# Patient Record
Sex: MALE | Race: WHITE | NOT HISPANIC OR LATINO | Employment: OTHER | URBAN - METROPOLITAN AREA
[De-identification: names, ages, dates, MRNs, and addresses within clinical notes are randomized per-mention and may not be internally consistent; named-entity substitution may affect disease eponyms.]

---

## 2018-01-25 ENCOUNTER — OFFICE VISIT (OUTPATIENT)
Dept: CARDIOLOGY | Facility: CLINIC | Age: 78
End: 2018-01-25
Attending: INTERNAL MEDICINE
Payer: MEDICARE

## 2018-01-25 VITALS
HEIGHT: 71 IN | WEIGHT: 248 LBS | BODY MASS INDEX: 34.72 KG/M2 | DIASTOLIC BLOOD PRESSURE: 89 MMHG | HEART RATE: 81 BPM | SYSTOLIC BLOOD PRESSURE: 153 MMHG

## 2018-01-25 DIAGNOSIS — J44.9 CHRONIC OBSTRUCTIVE PULMONARY DISEASE, UNSPECIFIED COPD TYPE: ICD-10-CM

## 2018-01-25 DIAGNOSIS — I10 ESSENTIAL HYPERTENSION: Primary | ICD-10-CM

## 2018-01-25 DIAGNOSIS — I48.20 CHRONIC ATRIAL FIBRILLATION: ICD-10-CM

## 2018-01-25 DIAGNOSIS — I63.9 CEREBROVASCULAR ACCIDENT (CVA), UNSPECIFIED MECHANISM: ICD-10-CM

## 2018-01-25 DIAGNOSIS — E11.9 TYPE 2 DIABETES MELLITUS WITHOUT COMPLICATION, WITHOUT LONG-TERM CURRENT USE OF INSULIN: ICD-10-CM

## 2018-01-25 DIAGNOSIS — G47.33 OSA (OBSTRUCTIVE SLEEP APNEA): ICD-10-CM

## 2018-01-25 DIAGNOSIS — E66.01 MORBID OBESITY: ICD-10-CM

## 2018-01-25 PROCEDURE — 93000 ELECTROCARDIOGRAM COMPLETE: CPT | Mod: S$GLB,,, | Performed by: INTERNAL MEDICINE

## 2018-01-25 PROCEDURE — 99213 OFFICE O/P EST LOW 20 MIN: CPT | Mod: S$GLB,,, | Performed by: INTERNAL MEDICINE

## 2018-01-26 NOTE — PROGRESS NOTES
Subjective:    Patient ID:  Riley Alas is a 77 y.o. male     HPI  Here for F/U of HBP, AF, COPD, MONO, DM    I feel well. I am not using my CPAP, and am due to have another sleep study.    Current Outpatient Prescriptions   Medication Sig    ADVAIR DISKUS 250-50 mcg/dose diskus inhaler     allopurinol (ZYLOPRIM) 300 MG tablet Take 300 mg by mouth once daily.      ASMANEX TWISTHALER 220 mcg (60 doses) AePB     atorvastatin (LIPITOR) 20 MG tablet     azithromycin (Z-NANETTE) 250 MG tablet     calcitRIOL (ROCALTROL) 0.25 MCG Cap     cefdinir (OMNICEF) 300 MG capsule     cephALEXin (KEFLEX) 500 MG capsule     CHERATUSSIN AC  mg/5 mL syrup     dabigatran etexilate (PRADAXA) 150 mg Cap Take 1 capsule (150 mg total) by mouth 2 (two) times daily.    digoxin (LANOXIN) 250 mcg tablet Take 250 mcg by mouth once daily.    diltiazem (CARDIZEM CD) 180 MG 24 hr capsule     diltiazem (CARDIZEM) 120 MG tablet     finasteride (PROSCAR) 5 mg tablet     fluoxetine (PROZAC) 20 MG tablet Take 20 mg by mouth once daily.      fluoxetine (PROZAC) 40 MG capsule     furosemide (LASIX) 10 mg/mL solution Take by mouth once daily.      JANUVIA 50 mg Tab     levetiracetam (KEPPRA) 500 MG Tab     levothyroxine (SYNTHROID) 25 MCG tablet     losartan (COZAAR) 100 MG tablet Take 100 mg by mouth once daily.    losartan (COZAAR) 25 MG tablet     metoprolol succinate (TOPROL-XL) 100 MG 24 hr tablet Take 100 mg by mouth 2 (two) times daily.      metoprolol tartrate (LOPRESSOR) 100 MG tablet     metoprolol tartrate (LOPRESSOR) 50 MG tablet     oxybutynin (DITROPAN) 5 MG Tab     ramipril (ALTACE) 2.5 MG capsule     SPIRIVA WITH HANDIHALER 18 mcg inhalation capsule INHALE ONE capsule into THE lungs ONCE DAILY    spironolactone (ALDACTONE) 50 MG tablet Take 50 mg by mouth once daily.      sucralfate (CARAFATE) 1 gram tablet     ZEMPLAR 1 mcg capsule     zolpidem (AMBIEN) 10 mg Tab      No current facility-administered  "medications for this visit.          Review of Systems   Constitution: Negative for chills, decreased appetite, fever, weight gain and weight loss.   HENT: Negative for congestion, hearing loss and sore throat.    Eyes: Negative for blurred vision, double vision and visual disturbance.   Cardiovascular: Negative for chest pain, claudication, dyspnea on exertion, leg swelling, palpitations and syncope.   Respiratory: Positive for wheezing. Negative for cough, hemoptysis, shortness of breath and sputum production.    Endocrine: Negative for cold intolerance and heat intolerance.   Hematologic/Lymphatic: Negative for bleeding problem. Does not bruise/bleed easily.   Skin: Negative for color change, dry skin, flushing and itching.   Musculoskeletal: Negative for back pain, joint pain and myalgias.   Gastrointestinal: Negative for abdominal pain, anorexia, constipation, diarrhea, dysphagia, nausea and vomiting.        No bleeding per rectum   Genitourinary: Negative for dysuria, flank pain, frequency, hematuria and nocturia.   Neurological: Negative for dizziness, headaches, light-headedness, loss of balance, seizures and tremors.   Psychiatric/Behavioral: Negative for altered mental status and depression.         Vitals:    01/25/18 1327   BP: (!) 153/89   Pulse: 81   Weight: 112.5 kg (248 lb)   Height: 5' 11" (1.803 m)     Objective:    Physical Exam   Constitutional: He is oriented to person, place, and time. He appears well-developed and well-nourished.   HENT:   Head: Normocephalic and atraumatic.   Right Ear: External ear normal.   Left Ear: External ear normal.   Nose: Nose normal.   Eyes: Conjunctivae and EOM are normal. Pupils are equal, round, and reactive to light. No scleral icterus.   Neck: Normal range of motion. Neck supple. No JVD present. No tracheal deviation present. No thyromegaly present.   Cardiovascular: Normal rate and normal heart sounds.  Exam reveals no gallop and no friction rub.    No murmur " heard.  Irregularly irregular   Pulmonary/Chest: Effort normal and breath sounds normal. No respiratory distress. He has no rales. He exhibits no tenderness.   Abdominal: Soft. Bowel sounds are normal. He exhibits no distension and no mass. There is no tenderness.   Musculoskeletal: Normal range of motion. He exhibits no edema or tenderness.   Lymphadenopathy:     He has no cervical adenopathy.   Neurological: He is alert and oriented to person, place, and time. He has normal reflexes. No cranial nerve deficit. Coordination normal.   Skin: Skin is warm and dry. No rash noted.   Psychiatric: He has a normal mood and affect. His behavior is normal.         Assessment:       1. Essential hypertension    2. Chronic atrial fibrillation    3. Morbid obesity    4. MONO (obstructive sleep apnea)    5. Chronic obstructive pulmonary disease, unspecified COPD type    6. Type 2 diabetes mellitus without complication, without long-term current use of insulin    7. Cerebrovascular accident (CVA), unspecified mechanism         Plan:       Sleep study, then resume CPAP  Same meds.

## 2018-05-03 ENCOUNTER — OFFICE VISIT (OUTPATIENT)
Dept: CARDIOLOGY | Facility: CLINIC | Age: 78
End: 2018-05-03
Attending: INTERNAL MEDICINE
Payer: MEDICARE

## 2018-05-03 VITALS
HEART RATE: 106 BPM | BODY MASS INDEX: 36.4 KG/M2 | DIASTOLIC BLOOD PRESSURE: 99 MMHG | WEIGHT: 260 LBS | HEIGHT: 71 IN | SYSTOLIC BLOOD PRESSURE: 160 MMHG

## 2018-05-03 DIAGNOSIS — E66.01 MORBID OBESITY: ICD-10-CM

## 2018-05-03 DIAGNOSIS — I10 ESSENTIAL HYPERTENSION: ICD-10-CM

## 2018-05-03 DIAGNOSIS — E11.9 TYPE 2 DIABETES MELLITUS WITHOUT COMPLICATION, WITHOUT LONG-TERM CURRENT USE OF INSULIN: ICD-10-CM

## 2018-05-03 DIAGNOSIS — I48.20 CHRONIC ATRIAL FIBRILLATION: Primary | ICD-10-CM

## 2018-05-03 DIAGNOSIS — J44.9 CHRONIC OBSTRUCTIVE PULMONARY DISEASE, UNSPECIFIED COPD TYPE: ICD-10-CM

## 2018-05-03 DIAGNOSIS — G47.33 OSA (OBSTRUCTIVE SLEEP APNEA): ICD-10-CM

## 2018-05-03 DIAGNOSIS — I63.9 CEREBROVASCULAR ACCIDENT (CVA), UNSPECIFIED MECHANISM: ICD-10-CM

## 2018-05-03 PROCEDURE — 3080F DIAST BP >= 90 MM HG: CPT | Mod: CPTII,S$GLB,, | Performed by: INTERNAL MEDICINE

## 2018-05-03 PROCEDURE — 99214 OFFICE O/P EST MOD 30 MIN: CPT | Mod: S$GLB,,, | Performed by: INTERNAL MEDICINE

## 2018-05-03 PROCEDURE — 3077F SYST BP >= 140 MM HG: CPT | Mod: CPTII,S$GLB,, | Performed by: INTERNAL MEDICINE

## 2018-05-03 NOTE — PROGRESS NOTES
"Subjective:    Patient ID:  Riley Alas is a 78 y.o. male     HPI  Here for F/U of AF, HBP, morbid obesity, MONO, CVA, DM    I feel well. I exercise with a  now, at home. I am not using a CPAP or my mouth piece.Wife says he "eats what he wants"  Dr Rodriguez has him set up for another sleep study.    Current Outpatient Prescriptions   Medication Sig    ADVAIR DISKUS 250-50 mcg/dose diskus inhaler     allopurinol (ZYLOPRIM) 300 MG tablet Take 300 mg by mouth once daily.      ASMANEX TWISTHALER 220 mcg (60 doses) AePB     atorvastatin (LIPITOR) 20 MG tablet     azithromycin (Z-NANETTE) 250 MG tablet     calcitRIOL (ROCALTROL) 0.25 MCG Cap     cefdinir (OMNICEF) 300 MG capsule     cephALEXin (KEFLEX) 500 MG capsule     CHERATUSSIN AC  mg/5 mL syrup     dabigatran etexilate (PRADAXA) 150 mg Cap Take 1 capsule (150 mg total) by mouth 2 (two) times daily.    digoxin (LANOXIN) 250 mcg tablet Take 250 mcg by mouth once daily.    diltiazem (CARDIZEM CD) 180 MG 24 hr capsule     diltiazem (CARDIZEM) 120 MG tablet     finasteride (PROSCAR) 5 mg tablet     fluoxetine (PROZAC) 20 MG tablet Take 20 mg by mouth once daily.      fluoxetine (PROZAC) 40 MG capsule     furosemide (LASIX) 10 mg/mL solution Take by mouth once daily.      JANUVIA 50 mg Tab     levetiracetam (KEPPRA) 500 MG Tab     levothyroxine (SYNTHROID) 25 MCG tablet     losartan (COZAAR) 100 MG tablet Take 100 mg by mouth once daily.    losartan (COZAAR) 25 MG tablet     metoprolol succinate (TOPROL-XL) 100 MG 24 hr tablet Take 100 mg by mouth 2 (two) times daily.      metoprolol tartrate (LOPRESSOR) 100 MG tablet     metoprolol tartrate (LOPRESSOR) 50 MG tablet     oxybutynin (DITROPAN) 5 MG Tab     ramipril (ALTACE) 2.5 MG capsule     SPIRIVA WITH HANDIHALER 18 mcg inhalation capsule INHALE ONE capsule into THE lungs ONCE DAILY    spironolactone (ALDACTONE) 50 MG tablet Take 50 mg by mouth once daily.      sucralfate (CARAFATE) " "1 gram tablet     ZEMPLAR 1 mcg capsule     zolpidem (AMBIEN) 10 mg Tab      No current facility-administered medications for this visit.          Review of Systems   Constitution: Negative for chills, decreased appetite, fever, weight gain and weight loss.   HENT: Negative for congestion, hearing loss and sore throat.    Eyes: Negative for blurred vision, double vision and visual disturbance.   Cardiovascular: Positive for dyspnea on exertion. Negative for chest pain, claudication, leg swelling, palpitations and syncope.   Respiratory: Negative for cough, hemoptysis, shortness of breath, sputum production and wheezing.    Endocrine: Negative for cold intolerance and heat intolerance.   Hematologic/Lymphatic: Negative for bleeding problem. Does not bruise/bleed easily.   Skin: Negative for color change, dry skin, flushing and itching.   Musculoskeletal: Negative for back pain, joint pain and myalgias.   Gastrointestinal: Negative for abdominal pain, anorexia, constipation, diarrhea, dysphagia, nausea and vomiting.        No bleeding per rectum   Genitourinary: Negative for dysuria, flank pain, frequency, hematuria and nocturia.   Neurological: Negative for dizziness, headaches, light-headedness, loss of balance, seizures and tremors.   Psychiatric/Behavioral: Negative for altered mental status and depression.         Vitals:    05/03/18 1224   BP: (!) 160/99   Pulse: 106   Weight: 117.9 kg (260 lb)   Height: 5' 11" (1.803 m)   BP rechecked: 145/88.    Objective:    Physical Exam   Constitutional: He is oriented to person, place, and time. He appears well-developed and well-nourished.   HENT:   Head: Normocephalic and atraumatic.   Right Ear: External ear normal.   Left Ear: External ear normal.   Nose: Nose normal.   Eyes: Conjunctivae and EOM are normal. Pupils are equal, round, and reactive to light. No scleral icterus.   Neck: Normal range of motion. Neck supple. No JVD present. No tracheal deviation present. " No thyromegaly present.   Cardiovascular: Normal rate and normal heart sounds.  Exam reveals no gallop and no friction rub.    No murmur heard.  Irregularly irregular   Pulmonary/Chest: Effort normal and breath sounds normal. No respiratory distress. He has no rales. He exhibits no tenderness.   Abdominal: Soft. Bowel sounds are normal. He exhibits no distension and no mass. There is no tenderness.   Musculoskeletal: Normal range of motion. He exhibits no edema or tenderness.   Lymphadenopathy:     He has no cervical adenopathy.   Neurological: He is alert and oriented to person, place, and time. He has normal reflexes. No cranial nerve deficit. Coordination normal.   Skin: Skin is warm and dry. No rash noted.   Psychiatric: He has a normal mood and affect. His behavior is normal.         Assessment:       1. Chronic atrial fibrillation    2. Essential hypertension    3. Chronic obstructive pulmonary disease, unspecified COPD type    4. Morbid obesity    5. MONO (obstructive sleep apnea)    6. Type 2 diabetes mellitus without complication, without long-term current use of insulin    7. Cerebrovascular accident (CVA), unspecified mechanism         Plan:       Counseled re weight loss  Counseled re using mouth piece for now, till he gets his new CPAP  Same meds.

## 2018-09-11 ENCOUNTER — OFFICE VISIT (OUTPATIENT)
Dept: CARDIOLOGY | Facility: CLINIC | Age: 78
End: 2018-09-11
Attending: INTERNAL MEDICINE
Payer: MEDICARE

## 2018-09-11 VITALS
HEART RATE: 91 BPM | DIASTOLIC BLOOD PRESSURE: 83 MMHG | SYSTOLIC BLOOD PRESSURE: 140 MMHG | WEIGHT: 253 LBS | HEIGHT: 71 IN | BODY MASS INDEX: 35.42 KG/M2

## 2018-09-11 DIAGNOSIS — I10 ESSENTIAL HYPERTENSION: ICD-10-CM

## 2018-09-11 DIAGNOSIS — E11.9 TYPE 2 DIABETES MELLITUS WITHOUT COMPLICATION, WITHOUT LONG-TERM CURRENT USE OF INSULIN: ICD-10-CM

## 2018-09-11 DIAGNOSIS — G47.33 OSA (OBSTRUCTIVE SLEEP APNEA): ICD-10-CM

## 2018-09-11 DIAGNOSIS — E66.01 MORBID OBESITY: ICD-10-CM

## 2018-09-11 DIAGNOSIS — J44.9 CHRONIC OBSTRUCTIVE PULMONARY DISEASE, UNSPECIFIED COPD TYPE: ICD-10-CM

## 2018-09-11 DIAGNOSIS — I48.20 CHRONIC ATRIAL FIBRILLATION: Primary | ICD-10-CM

## 2018-09-11 DIAGNOSIS — I63.9 CEREBROVASCULAR ACCIDENT (CVA), UNSPECIFIED MECHANISM: ICD-10-CM

## 2018-09-11 PROCEDURE — 3079F DIAST BP 80-89 MM HG: CPT | Mod: CPTII,S$GLB,, | Performed by: INTERNAL MEDICINE

## 2018-09-11 PROCEDURE — 3077F SYST BP >= 140 MM HG: CPT | Mod: CPTII,S$GLB,, | Performed by: INTERNAL MEDICINE

## 2018-09-11 PROCEDURE — 99213 OFFICE O/P EST LOW 20 MIN: CPT | Mod: S$GLB,,, | Performed by: INTERNAL MEDICINE

## 2018-09-16 NOTE — PROGRESS NOTES
"Subjective:    Patient ID:  Riley Alas is a 78 y.o. male     HPI   Here for follow-up of chronic atrial fibrillation, essential hypertension, COPD, diabetes mellitus, morbid obesity, obstructive sleep apnea, cerebrovascular accident.    I am doing all right as long as I am on the oxygen.  When I get around I get winded.  I am wearing the CPAP regularly.    Review of Systems   Constitution: Negative for chills, decreased appetite, fever, weight gain and weight loss.   HENT: Negative for congestion, hearing loss and sore throat.    Eyes: Negative for blurred vision, double vision and visual disturbance.   Cardiovascular: Positive for dyspnea on exertion. Negative for chest pain, claudication, leg swelling, palpitations and syncope.   Respiratory: Negative for cough, hemoptysis, shortness of breath, sputum production and wheezing.    Endocrine: Negative for cold intolerance and heat intolerance.   Hematologic/Lymphatic: Negative for bleeding problem. Does not bruise/bleed easily.   Skin: Negative for color change, dry skin, flushing and itching.   Musculoskeletal: Negative for back pain, joint pain and myalgias.   Gastrointestinal: Negative for abdominal pain, anorexia, constipation, diarrhea, dysphagia, nausea and vomiting.        No bleeding per rectum   Genitourinary: Negative for dysuria, flank pain, frequency, hematuria and nocturia.   Neurological: Negative for dizziness, headaches, light-headedness, loss of balance, seizures and tremors.   Psychiatric/Behavioral: Negative for altered mental status and depression.         Vitals:    09/11/18 1506   BP: (!) 140/83   Pulse: 91   Weight: 114.8 kg (253 lb)   Height: 5' 11" (1.803 m)     Objective:    Physical Exam   Constitutional: He is oriented to person, place, and time. He appears well-developed and well-nourished.   HENT:   Head: Normocephalic and atraumatic.   Right Ear: External ear normal.   Left Ear: External ear normal.   Nose: Nose normal.   Eyes: " Conjunctivae and EOM are normal. Pupils are equal, round, and reactive to light. No scleral icterus.   Neck: Normal range of motion. Neck supple. No JVD present. No tracheal deviation present. No thyromegaly present.   Cardiovascular: Normal rate and normal heart sounds. Exam reveals no gallop and no friction rub.   No murmur heard.  Irregularly irregular   Pulmonary/Chest: Effort normal and breath sounds normal. No respiratory distress. He has no rales. He exhibits no tenderness.   Abdominal: Soft. Bowel sounds are normal. He exhibits no distension and no mass. There is no tenderness.   Musculoskeletal: Normal range of motion. He exhibits edema. He exhibits no tenderness.   Lymphadenopathy:     He has no cervical adenopathy.   Neurological: He is alert and oriented to person, place, and time. He has normal reflexes. No cranial nerve deficit. Coordination normal.   Skin: Skin is warm and dry. No rash noted.   Psychiatric: He has a normal mood and affect. His behavior is normal.         Assessment:       1. Chronic atrial fibrillation    2. Essential hypertension    3. Chronic obstructive pulmonary disease, unspecified COPD type    4. Type 2 diabetes mellitus without complication, without long-term current use of insulin    5. Morbid obesity    6. MONO (obstructive sleep apnea)    7. Cerebrovascular accident (CVA), unspecified mechanism         Plan:       Stable from a cardiovascular standpoint.  Continue the same medical regimen.

## 2018-12-10 ENCOUNTER — OFFICE VISIT (OUTPATIENT)
Dept: CARDIOLOGY | Facility: CLINIC | Age: 78
End: 2018-12-10
Attending: INTERNAL MEDICINE
Payer: MEDICARE

## 2018-12-10 VITALS
SYSTOLIC BLOOD PRESSURE: 129 MMHG | HEIGHT: 71 IN | BODY MASS INDEX: 35 KG/M2 | DIASTOLIC BLOOD PRESSURE: 74 MMHG | HEART RATE: 98 BPM | WEIGHT: 250 LBS

## 2018-12-10 DIAGNOSIS — E11.9 TYPE 2 DIABETES MELLITUS WITHOUT COMPLICATION, WITHOUT LONG-TERM CURRENT USE OF INSULIN: ICD-10-CM

## 2018-12-10 DIAGNOSIS — I48.20 CHRONIC ATRIAL FIBRILLATION: Primary | ICD-10-CM

## 2018-12-10 DIAGNOSIS — E66.01 MORBID OBESITY: ICD-10-CM

## 2018-12-10 DIAGNOSIS — I10 ESSENTIAL HYPERTENSION: ICD-10-CM

## 2018-12-10 DIAGNOSIS — J44.9 CHRONIC OBSTRUCTIVE PULMONARY DISEASE, UNSPECIFIED COPD TYPE: ICD-10-CM

## 2018-12-10 DIAGNOSIS — I63.30 CEREBROVASCULAR ACCIDENT (CVA) DUE TO THROMBOSIS OF CEREBRAL ARTERY: ICD-10-CM

## 2018-12-10 PROCEDURE — 3074F SYST BP LT 130 MM HG: CPT | Mod: CPTII,S$GLB,, | Performed by: INTERNAL MEDICINE

## 2018-12-10 PROCEDURE — 99213 OFFICE O/P EST LOW 20 MIN: CPT | Mod: S$GLB,,, | Performed by: INTERNAL MEDICINE

## 2018-12-10 PROCEDURE — 3078F DIAST BP <80 MM HG: CPT | Mod: CPTII,S$GLB,, | Performed by: INTERNAL MEDICINE

## 2018-12-10 NOTE — PROGRESS NOTES
Subjective:    Patient ID:  Riley Alas is a 78 y.o. male     HPI   Here for follow-up of chronic atrial fibrillation, essential hypertension, COPD, obesity, diabetes mellitus, previous stroke.    I feel well.  Have lost a fair amount of weight.  My breathing is much better.  I am on a tapering course of steroids given to me by Dr. Rodriguez.    Current Outpatient Medications   Medication Sig    ADVAIR DISKUS 250-50 mcg/dose diskus inhaler     allopurinol (ZYLOPRIM) 300 MG tablet Take 300 mg by mouth once daily.      apixaban (ELIQUIS ORAL) Take by mouth.    apixaban (ELIQUIS) 5 mg Tab Take 5 mg by mouth 2 (two) times daily.    ASMANEX TWISTHALER 220 mcg (60 doses) AePB     atorvastatin (LIPITOR) 20 MG tablet     azithromycin (Z-NANETTE) 250 MG tablet     calcitRIOL (ROCALTROL) 0.25 MCG Cap     cefdinir (OMNICEF) 300 MG capsule     cephALEXin (KEFLEX) 500 MG capsule     CHERATUSSIN AC  mg/5 mL syrup     dabigatran etexilate (PRADAXA) 150 mg Cap Take 1 capsule (150 mg total) by mouth 2 (two) times daily.    digoxin (LANOXIN) 250 mcg tablet Take 250 mcg by mouth once daily.    diltiazem (CARDIZEM CD) 180 MG 24 hr capsule     diltiazem (CARDIZEM) 120 MG tablet     finasteride (PROSCAR) 5 mg tablet     fluoxetine (PROZAC) 20 MG tablet Take 20 mg by mouth once daily.      fluoxetine (PROZAC) 40 MG capsule     furosemide (LASIX) 10 mg/mL solution Take by mouth once daily.      JANUVIA 50 mg Tab     levetiracetam (KEPPRA) 500 MG Tab     levothyroxine (SYNTHROID) 25 MCG tablet     losartan (COZAAR) 100 MG tablet Take 100 mg by mouth once daily.    losartan (COZAAR) 25 MG tablet     metoprolol succinate (TOPROL-XL) 100 MG 24 hr tablet Take 100 mg by mouth 2 (two) times daily.      metoprolol tartrate (LOPRESSOR) 100 MG tablet     metoprolol tartrate (LOPRESSOR) 50 MG tablet     oxybutynin (DITROPAN) 5 MG Tab     ramipril (ALTACE) 2.5 MG capsule     SPIRIVA WITH HANDIHALER 18 mcg inhalation  "capsule INHALE ONE PUFF BY MOUTH ONCE DAILY    spironolactone (ALDACTONE) 50 MG tablet Take 50 mg by mouth once daily.      sucralfate (CARAFATE) 1 gram tablet     ZEMPLAR 1 mcg capsule     zolpidem (AMBIEN) 10 mg Tab      No current facility-administered medications for this visit.          Review of Systems   Constitution: Positive for weight loss. Negative for chills, decreased appetite, fever and weight gain.   HENT: Negative for congestion, hearing loss and sore throat.    Eyes: Negative for blurred vision, double vision and visual disturbance.   Cardiovascular: Negative for chest pain, claudication, dyspnea on exertion, leg swelling, palpitations and syncope.   Respiratory: Negative for cough, hemoptysis, shortness of breath, sputum production and wheezing.    Endocrine: Negative for cold intolerance and heat intolerance.   Hematologic/Lymphatic: Negative for bleeding problem. Does not bruise/bleed easily.   Skin: Negative for color change, dry skin, flushing and itching.   Musculoskeletal: Negative for back pain, joint pain and myalgias.   Gastrointestinal: Negative for abdominal pain, anorexia, constipation, diarrhea, dysphagia, nausea and vomiting.        No bleeding per rectum   Genitourinary: Negative for dysuria, flank pain, frequency, hematuria and nocturia.   Neurological: Negative for dizziness, headaches, light-headedness, loss of balance, seizures and tremors.   Psychiatric/Behavioral: Negative for altered mental status and depression.         Vitals:    12/10/18 1113   BP: 129/74   Pulse: 98   Weight: 113.4 kg (250 lb)   Height: 5' 11" (1.803 m)     Objective:    Physical Exam   Constitutional: He is oriented to person, place, and time. He appears well-developed and well-nourished.   HENT:   Head: Normocephalic and atraumatic.   Right Ear: External ear normal.   Left Ear: External ear normal.   Nose: Nose normal.   Eyes: Conjunctivae and EOM are normal. Pupils are equal, round, and reactive to " light. No scleral icterus.   Neck: Normal range of motion. Neck supple. No JVD present. No tracheal deviation present. No thyromegaly present.   Cardiovascular: Normal rate and normal heart sounds. Exam reveals no gallop and no friction rub.   No murmur heard.  Irregularly irregular   Pulmonary/Chest: Effort normal and breath sounds normal. No respiratory distress. He has no rales. He exhibits no tenderness.   Abdominal: Soft. Bowel sounds are normal. He exhibits no distension and no mass. There is no tenderness.   Musculoskeletal: Normal range of motion. He exhibits no edema or tenderness.   Lymphadenopathy:     He has no cervical adenopathy.   Neurological: He is alert and oriented to person, place, and time. He has normal reflexes. No cranial nerve deficit. Coordination normal.   Skin: Skin is warm and dry. No rash noted.   Psychiatric: He has a normal mood and affect. His behavior is normal.         Assessment:       1. Chronic atrial fibrillation    2. Essential hypertension    3. Chronic obstructive pulmonary disease, unspecified COPD type    4. Morbid obesity    5. Type 2 diabetes mellitus without complication, without long-term current use of insulin    6. 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  Continue the same pharmacological regimen.

## 2019-05-13 ENCOUNTER — OFFICE VISIT (OUTPATIENT)
Dept: CARDIOLOGY | Facility: CLINIC | Age: 79
End: 2019-05-13
Attending: INTERNAL MEDICINE
Payer: MEDICARE

## 2019-05-13 VITALS
HEIGHT: 71 IN | DIASTOLIC BLOOD PRESSURE: 85 MMHG | WEIGHT: 253 LBS | HEART RATE: 72 BPM | SYSTOLIC BLOOD PRESSURE: 144 MMHG | BODY MASS INDEX: 35.42 KG/M2

## 2019-05-13 DIAGNOSIS — E11.9 TYPE 2 DIABETES MELLITUS WITHOUT COMPLICATION, WITHOUT LONG-TERM CURRENT USE OF INSULIN: ICD-10-CM

## 2019-05-13 DIAGNOSIS — I48.20 CHRONIC ATRIAL FIBRILLATION: Primary | ICD-10-CM

## 2019-05-13 DIAGNOSIS — I63.30 CEREBROVASCULAR ACCIDENT (CVA) DUE TO THROMBOSIS OF CEREBRAL ARTERY: ICD-10-CM

## 2019-05-13 DIAGNOSIS — I10 ESSENTIAL HYPERTENSION: ICD-10-CM

## 2019-05-13 DIAGNOSIS — J44.9 CHRONIC OBSTRUCTIVE PULMONARY DISEASE, UNSPECIFIED COPD TYPE: ICD-10-CM

## 2019-05-13 DIAGNOSIS — E66.01 MORBID OBESITY: ICD-10-CM

## 2019-05-13 DIAGNOSIS — G47.33 OSA (OBSTRUCTIVE SLEEP APNEA): ICD-10-CM

## 2019-05-13 PROCEDURE — 3077F PR MOST RECENT SYSTOLIC BLOOD PRESSURE >= 140 MM HG: ICD-10-PCS | Mod: CPTII,S$GLB,, | Performed by: INTERNAL MEDICINE

## 2019-05-13 PROCEDURE — 3079F PR MOST RECENT DIASTOLIC BLOOD PRESSURE 80-89 MM HG: ICD-10-PCS | Mod: CPTII,S$GLB,, | Performed by: INTERNAL MEDICINE

## 2019-05-13 PROCEDURE — 99214 OFFICE O/P EST MOD 30 MIN: CPT | Mod: S$GLB,,, | Performed by: INTERNAL MEDICINE

## 2019-05-13 PROCEDURE — 99214 PR OFFICE/OUTPT VISIT, EST, LEVL IV, 30-39 MIN: ICD-10-PCS | Mod: S$GLB,,, | Performed by: INTERNAL MEDICINE

## 2019-05-13 PROCEDURE — 3079F DIAST BP 80-89 MM HG: CPT | Mod: CPTII,S$GLB,, | Performed by: INTERNAL MEDICINE

## 2019-05-13 PROCEDURE — 3077F SYST BP >= 140 MM HG: CPT | Mod: CPTII,S$GLB,, | Performed by: INTERNAL MEDICINE

## 2019-05-19 NOTE — PROGRESS NOTES
Subjective:    Patient ID:  Riley Alas is a 79 y.o. male     HPI   Here for follow-up of chronic atrial fibrillation, essential hypertension, diabetes mellitus, COPD, morbid obesity, obstructive sleep apnea, history of cerebrovascular accident.    My breathing is about the same.  I am due to see  for a follow-up from the neurological standpoint.  Have weakness in both lower legs.  Do think rehabilitation will help?    Current Outpatient Medications   Medication Sig    ADVAIR DISKUS 250-50 mcg/dose diskus inhaler     allopurinol (ZYLOPRIM) 300 MG tablet Take 300 mg by mouth once daily.      apixaban (ELIQUIS ORAL) Take by mouth.    apixaban (ELIQUIS) 5 mg Tab Take 5 mg by mouth 2 (two) times daily.    ASMANEX TWISTHALER 220 mcg (60 doses) AePB     atorvastatin (LIPITOR) 20 MG tablet     azithromycin (Z-NANETTE) 250 MG tablet     calcitRIOL (ROCALTROL) 0.25 MCG Cap     cefdinir (OMNICEF) 300 MG capsule     cephALEXin (KEFLEX) 500 MG capsule     CHERATUSSIN AC  mg/5 mL syrup     dabigatran etexilate (PRADAXA) 150 mg Cap Take 1 capsule (150 mg total) by mouth 2 (two) times daily.    digoxin (LANOXIN) 250 mcg tablet Take 250 mcg by mouth once daily.    diltiazem (CARDIZEM CD) 180 MG 24 hr capsule     diltiazem (CARDIZEM) 120 MG tablet     finasteride (PROSCAR) 5 mg tablet     fluoxetine (PROZAC) 20 MG tablet Take 20 mg by mouth once daily.      fluoxetine (PROZAC) 40 MG capsule     furosemide (LASIX) 10 mg/mL solution Take by mouth once daily.      JANUVIA 50 mg Tab     levetiracetam (KEPPRA) 500 MG Tab     levothyroxine (SYNTHROID) 25 MCG tablet     losartan (COZAAR) 100 MG tablet Take 100 mg by mouth once daily.    losartan (COZAAR) 25 MG tablet     metoprolol succinate (TOPROL-XL) 100 MG 24 hr tablet Take 100 mg by mouth 2 (two) times daily.      metoprolol tartrate (LOPRESSOR) 100 MG tablet     metoprolol tartrate (LOPRESSOR) 50 MG tablet     oxybutynin (DITROPAN) 5 MG Tab   "   ramipril (ALTACE) 2.5 MG capsule     SPIRIVA WITH HANDIHALER 18 mcg inhalation capsule INHALE ONE PUFF BY MOUTH ONCE DAILY    spironolactone (ALDACTONE) 50 MG tablet Take 50 mg by mouth once daily.      sucralfate (CARAFATE) 1 gram tablet     ZEMPLAR 1 mcg capsule     zolpidem (AMBIEN) 10 mg Tab      No current facility-administered medications for this visit.          Review of Systems   Constitution: Positive for malaise/fatigue. Negative for chills, decreased appetite, fever, weight gain and weight loss.   HENT: Negative for congestion, hearing loss and sore throat.    Eyes: Negative for blurred vision, double vision and visual disturbance.   Cardiovascular: Positive for leg swelling. Negative for chest pain, claudication, dyspnea on exertion, palpitations and syncope.   Respiratory: Positive for shortness of breath. Negative for cough, hemoptysis, sputum production and wheezing.    Endocrine: Negative for cold intolerance and heat intolerance.   Hematologic/Lymphatic: Negative for bleeding problem. Does not bruise/bleed easily.   Skin: Negative for color change, dry skin, flushing and itching.   Musculoskeletal: Negative for back pain, joint pain and myalgias.   Gastrointestinal: Negative for abdominal pain, anorexia, constipation, diarrhea, dysphagia, nausea and vomiting.        No bleeding per rectum   Genitourinary: Negative for dysuria, flank pain, frequency, hematuria and nocturia.   Neurological: Negative for dizziness, headaches, light-headedness, loss of balance, seizures and tremors.   Psychiatric/Behavioral: Negative for altered mental status and depression.         Vitals:    05/13/19 1337   BP: (!) 144/85   Pulse: 72   Weight: 114.8 kg (253 lb)   Height: 5' 11" (1.803 m)     Objective:    Physical Exam   Constitutional: He is oriented to person, place, and time. He appears well-developed and well-nourished.   HENT:   Head: Normocephalic and atraumatic.   Right Ear: External ear normal. "   Left Ear: External ear normal.   Nose: Nose normal.   Eyes: Pupils are equal, round, and reactive to light. Conjunctivae and EOM are normal. No scleral icterus.   Neck: Normal range of motion. Neck supple. No JVD present. No tracheal deviation present. No thyromegaly present.   Cardiovascular: Normal rate and normal heart sounds. Exam reveals no gallop and no friction rub.   No murmur heard.  Irregularly irregular   Pulmonary/Chest: Effort normal and breath sounds normal. No respiratory distress. He has no rales. He exhibits no tenderness.   Abdominal: Soft. Bowel sounds are normal. He exhibits no distension and no mass. There is no tenderness.   Musculoskeletal: Normal range of motion. He exhibits edema. He exhibits no tenderness.   Lymphadenopathy:     He has no cervical adenopathy.   Neurological: He is alert and oriented to person, place, and time. He has normal reflexes. No cranial nerve deficit. Coordination normal.   Skin: Skin is warm and dry. No rash noted.   Psychiatric: He has a normal mood and affect. His behavior is normal.         Assessment:       1. Chronic atrial fibrillation    2. Essential hypertension    3. Type 2 diabetes mellitus without complication, without long-term current use of insulin    4. Morbid obesity    5. MONO (obstructive sleep apnea)    6. 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 Chronic obstructive pulmonary disease, unspecified COPD type         Plan:       Stable from a cardiovascular standpoint.  To see , and to consider PT/ rehab  Continue present pharmacological regimen.

## 2019-08-13 RX ORDER — FLUTICASONE PROPIONATE AND SALMETEROL 500; 50 UG/1; UG/1
1 POWDER RESPIRATORY (INHALATION) 2 TIMES DAILY
COMMUNITY

## 2019-08-13 RX ORDER — GLUCOSAMINE HCL 500 MG
1 TABLET ORAL
COMMUNITY

## 2019-08-13 RX ORDER — MEMANTINE HYDROCHLORIDE 10 MG/1
10 TABLET ORAL DAILY
COMMUNITY

## 2019-08-13 RX ORDER — PANTOPRAZOLE SODIUM 40 MG/1
40 TABLET, DELAYED RELEASE ORAL DAILY
COMMUNITY

## 2019-08-20 ENCOUNTER — HOSPITAL ENCOUNTER (OUTPATIENT)
Facility: OTHER | Age: 79
Discharge: HOME | End: 2019-08-20
Attending: OPHTHALMOLOGY | Admitting: OPHTHALMOLOGY
Payer: MEDICARE

## 2019-08-20 ENCOUNTER — ANESTHESIA EVENT (OUTPATIENT)
Dept: SURGERY | Facility: OTHER | Age: 79
End: 2019-08-20
Payer: MEDICARE

## 2019-08-20 ENCOUNTER — ANESTHESIA (OUTPATIENT)
Dept: SURGERY | Facility: OTHER | Age: 79
End: 2019-08-20
Payer: MEDICARE

## 2019-08-20 VITALS
BODY MASS INDEX: 35 KG/M2 | SYSTOLIC BLOOD PRESSURE: 128 MMHG | RESPIRATION RATE: 18 BRPM | HEART RATE: 74 BPM | OXYGEN SATURATION: 97 % | TEMPERATURE: 98 F | DIASTOLIC BLOOD PRESSURE: 71 MMHG | HEIGHT: 71 IN | WEIGHT: 250 LBS

## 2019-08-20 DIAGNOSIS — H25.12 NUCLEAR SCLEROTIC CATARACT OF LEFT EYE: Primary | ICD-10-CM

## 2019-08-20 LAB — POCT GLUCOSE: 120 MG/DL (ref 70–110)

## 2019-08-20 PROCEDURE — 71000015 HC POSTOP RECOV 1ST HR: Performed by: OPHTHALMOLOGY

## 2019-08-20 PROCEDURE — 27201423 OPTIME MED/SURG SUP & DEVICES STERILE SUPPLY: Performed by: OPHTHALMOLOGY

## 2019-08-20 PROCEDURE — 63600175 PHARM REV CODE 636 W HCPCS: Performed by: OPHTHALMOLOGY

## 2019-08-20 PROCEDURE — 36000707: Performed by: OPHTHALMOLOGY

## 2019-08-20 PROCEDURE — 36000706: Performed by: OPHTHALMOLOGY

## 2019-08-20 PROCEDURE — 37000008 HC ANESTHESIA 1ST 15 MINUTES: Performed by: OPHTHALMOLOGY

## 2019-08-20 PROCEDURE — 37000009 HC ANESTHESIA EA ADD 15 MINS: Performed by: OPHTHALMOLOGY

## 2019-08-20 PROCEDURE — 63600175 PHARM REV CODE 636 W HCPCS: Performed by: NURSE ANESTHETIST, CERTIFIED REGISTERED

## 2019-08-20 PROCEDURE — V2632 POST CHMBR INTRAOCULAR LENS: HCPCS | Performed by: OPHTHALMOLOGY

## 2019-08-20 PROCEDURE — 25000003 PHARM REV CODE 250: Performed by: OPHTHALMOLOGY

## 2019-08-20 PROCEDURE — 82962 GLUCOSE BLOOD TEST: CPT | Performed by: OPHTHALMOLOGY

## 2019-08-20 PROCEDURE — 25000003 PHARM REV CODE 250

## 2019-08-20 DEVICE — LENS 19.0 ACRYSOF: Type: IMPLANTABLE DEVICE | Site: EYE | Status: FUNCTIONAL

## 2019-08-20 RX ORDER — CYCLOPENTOLATE HYDROCHLORIDE 10 MG/ML
1 SOLUTION/ DROPS OPHTHALMIC EVERY 5 MIN PRN
Status: COMPLETED | OUTPATIENT
Start: 2019-08-20 | End: 2019-08-20

## 2019-08-20 RX ORDER — TROPICAMIDE 10 MG/ML
1 SOLUTION/ DROPS OPHTHALMIC EVERY 5 MIN PRN
Status: COMPLETED | OUTPATIENT
Start: 2019-08-20 | End: 2019-08-20

## 2019-08-20 RX ORDER — EPINEPHRINE 1 MG/ML
INJECTION, SOLUTION INTRACARDIAC; INTRAMUSCULAR; INTRAVENOUS; SUBCUTANEOUS
Status: DISCONTINUED | OUTPATIENT
Start: 2019-08-20 | End: 2019-08-20 | Stop reason: HOSPADM

## 2019-08-20 RX ORDER — LIDOCAINE HYDROCHLORIDE 20 MG/ML
JELLY TOPICAL
Status: DISCONTINUED | OUTPATIENT
Start: 2019-08-20 | End: 2019-08-20 | Stop reason: HOSPADM

## 2019-08-20 RX ORDER — MOXIFLOXACIN 5 MG/ML
SOLUTION/ DROPS OPHTHALMIC
Status: DISCONTINUED | OUTPATIENT
Start: 2019-08-20 | End: 2019-08-20 | Stop reason: HOSPADM

## 2019-08-20 RX ORDER — PREDNISOLONE ACETATE 10 MG/ML
SUSPENSION/ DROPS OPHTHALMIC
Status: DISCONTINUED | OUTPATIENT
Start: 2019-08-20 | End: 2019-08-20 | Stop reason: HOSPADM

## 2019-08-20 RX ORDER — MOXIFLOXACIN 5 MG/ML
1 SOLUTION/ DROPS OPHTHALMIC EVERY 5 MIN PRN
Status: COMPLETED | OUTPATIENT
Start: 2019-08-20 | End: 2019-08-20

## 2019-08-20 RX ORDER — MIDAZOLAM HYDROCHLORIDE 1 MG/ML
INJECTION INTRAMUSCULAR; INTRAVENOUS
Status: DISCONTINUED | OUTPATIENT
Start: 2019-08-20 | End: 2019-08-20

## 2019-08-20 RX ORDER — LIDOCAINE HYDROCHLORIDE 40 MG/ML
INJECTION, SOLUTION RETROBULBAR
Status: DISCONTINUED | OUTPATIENT
Start: 2019-08-20 | End: 2019-08-20 | Stop reason: HOSPADM

## 2019-08-20 RX ORDER — PHENYLEPHRINE HYDROCHLORIDE 25 MG/ML
1 SOLUTION/ DROPS OPHTHALMIC EVERY 5 MIN PRN
Status: COMPLETED | OUTPATIENT
Start: 2019-08-20 | End: 2019-08-20

## 2019-08-20 RX ADMIN — CYCLOPENTOLATE HYDROCHLORIDE 1 DROP: 10 SOLUTION/ DROPS OPHTHALMIC at 06:08

## 2019-08-20 RX ADMIN — PHENYLEPHRINE HYDROCHLORIDE 1 DROP: 25 SOLUTION/ DROPS OPHTHALMIC at 06:08

## 2019-08-20 RX ADMIN — TROPICAMIDE 1 DROP: 10 SOLUTION/ DROPS OPHTHALMIC at 06:08

## 2019-08-20 RX ADMIN — MOXIFLOXACIN HYDROCHLORIDE 1 DROP: 5 SOLUTION/ DROPS OPHTHALMIC at 06:08

## 2019-08-20 RX ADMIN — MIDAZOLAM HYDROCHLORIDE 1 MG: 1 INJECTION, SOLUTION INTRAMUSCULAR; INTRAVENOUS at 07:08

## 2019-08-20 NOTE — DISCHARGE INSTRUCTIONS
No heavy activity. No bending over. Keep eye shielded at all times except to put eye drops.Use vigamox (or ciprofloxacin or Polytrim), prednisolone acetate (or lotemax or durezol) and nevanac (or ketorolac) four times a day (if ilevro or prolensa once a day) in the operate eye starting today.      Anesthesia: Monitored Anesthesia Care (MAC)      What is monitored anesthesia care?  MAC keeps you very drowsy during surgery. You may be awake, but you will likely not remember much. And you wont feel pain. With MAC, medicines are given through an IV line into a vein in your arm or hand. A local anesthetic will usually be injected into the skin and muscle around the surgical site to numb it. The anesthesia provider monitors you during the procedure. He or she checks your heart rate and rhythm, blood pressure, and blood oxygen level.  Anesthesia tools and medicines that may be near you during your procedure  You will likely have:  · A pulse oximeter on the end of your finger. This measures your blood oxygen level.  · Electrocardiography leads (electrodes) on your chest. These record your heart rate and rhythm.  · Medicines given through an IV. These relax you and prevent pain. You may be awake or sleep lightly. If you have local anesthetic, it is injected directly into your skin.  · A facemask to give you oxygen, if needed.  Risks and possible complications  MAC has some risks. These include:  · Breathing problems  · Nausea and vomiting  · Allergic reaction to the anesthetic    Anesthesia safety  Tips for anesthesia safety include the following:   · Follow all instructions you are given for how long not to eat or drink before your procedure.  · Be sure your healthcare provider knows what medicines you take, especially any anti-inflammatory medicine or blood thinners. This includes aspirin and any other over-the-counter medicines, herbs, and supplements.  · Have an adult family member or friend drive you home after the  procedure.  · For the first 24 hours after your surgery:  ¨ Do not drive or use heavy equipment.  ¨ Do not make important decisions or sign documents.  ¨ Avoid alcohol.  ¨ Have someone stay with you, if possible. They can watch for problems and help keep you safe.  Date Last Reviewed: 12/1/2016  © 2467-9652 [employer]. [address] [place] [address], [place] [id number]. All rights reserved. This information is not intended as a substitute for professional medical care. Always follow your healthcare professional's instructions.      FOLLOW ANY OTHER INSTRUCTIONS GIVEN TO YOU BY DR. BERMEO!!!

## 2019-08-20 NOTE — OP NOTE
DATE OF PROCEDURE:  8/20/19    PREOPERATIVE DIAGNOSES:  1.  Left combined senile cataract.  2.  Poor red reflex, right eye.  3.  Small pupil OS    POSTOPERATIVE DIAGNOSES:  1.  Left combined senile cataract.  2.  Poor red reflex, right eye.  3.  Small pupil OS    PROCEDURE PERFORMED:  Phaco with PCIOL with trypan blue and iris hooks left eye.    ANESTHESIA:  MAC/topical.    COMPLICATIONS:  None.    SURGEON: Lore Limon MD    BLOOD LOSS: none    PROCEDURE IN DETAIL:  The patient received preservative-free lidocaine gel in   her right eye in the holding area.  A [place] balloon was applied to the left   orbit for about 10 minutes.  The patient was brought into the Operating Room.    The patient received mild sedation by Anesthesia.  The patient's left eye was   prepped and draped in the usual sterile manner for Ophthalmology.  A lid   speculum was placed to open the right eyelids.  Surgery was done under the   microscope.  Under the microscope, it was noted that there was a poor red   reflex secondary to a dense cataract and a small pupil. Using the 0.12s and side-port   incision blade, a paracentesis was done at the 5 o'clock position.    Nonpreserved epi shugarcaine was injected into the anterior chamber   followed by air, followed by trypan blue, followed by Viscoat.  Using the   keratome blade, a clear corneal incision was done at the 2:30 o'clock position. Then four iris hooks were inserted and the pupil dilated. Using cystotome and Utrata forceps, a capsulorrhexis was performed. There was some zonular weakness at about 4-5 o clock and the capsulorrhexis kept going periferally so using micro scissors it was completed without complications.  Using BSS in a cannula, hydrodissection was performed.  Using   the phaco machine, the nucleus was phacoemulsified in a divide-and-conquer and combined chopping fashion.  Cortical material was aspirated.  polished.  Viscoelastic was injected into the capsular bag and a 19.0  diopter   [id number] PCIOL was injected into the capsular bag without complications.  The iris hooks were removed.  Viscoelastic was removed from the eye.  The wound and paracentesis were hydrated   with BSS.  No leakage was noted but a 10-0 nylon suture was placed. Vigamox and Omnipred eye drops were applied   to the eye.  The eye was shielded.  The patient tolerated the procedure well and   went back to her room in stable condition.  The patient will be seen the next   [facility] at [facility]. The patient had difficulty staying still during the whole procedure.

## 2019-08-20 NOTE — ANESTHESIA PREPROCEDURE EVALUATION
08/20/2019  Riley Alas is a 79 y.o., male.    Anesthesia Evaluation    I have reviewed the Patient Summary Reports.    I have reviewed the Nursing Notes.   I have reviewed the Medications.     Review of Systems  Anesthesia Hx:  No problems with previous Anesthesia    Social:  Former Smoker, No Alcohol Use Marijuana, Cocaine history.   Hematology/Oncology:  Hematology Normal       -- Cancer in past history:  Oncology Comments: Prostate cancer     EENT/Dental:EENT/Dental Normal   Cardiovascular:   Exercise tolerance: poor Hypertension Dysrhythmias atrial fibrillation    Pulmonary:   COPD Sleep Apnea    Renal/:  Renal/ Normal     Hepatic/GI:  Hepatic/GI Normal    Neurological:   CVA, residual symptoms Neuromuscular Disease, Seizures, well controlled Occasional slurred speech and difficulty swallowing.  Peripheral Neuropathy    Endocrine:   Diabetes, type 2 Hypothyroidism    Dermatological:  Skin Normal    Psych:   Gets confused. Wife signed his consent.         Physical Exam  General:  Obesity    Airway/Jaw/Neck:  Airway Findings: Mouth Opening: Normal Tongue: Normal  General Airway Assessment: Adult, Average  Mallampati: II  TM Distance: 4 - 6 cm  Jaw/Neck Findings:  Neck ROM: Extension Decreased, Mild      Dental:  Dental Findings: In tact        Mental Status:  Mental Status Findings:  Cooperative, Alert and Oriented         Anesthesia Plan  Type of Anesthesia, risks & benefits discussed:  Anesthesia Type:  MAC  Patient's Preference:   Intra-op Monitoring Plan: standard ASA monitors  Intra-op Monitoring Plan Comments:   Post Op Pain Control Plan: per primary service following discharge from PACU  Post Op Pain Control Plan Comments:   Induction:    Beta Blocker:         Informed Consent: Patient representative understands risks and agrees with Anesthesia plan.  Questions answered. Anesthesia consent  signed with patient representative.  ASA Score: 3     Day of Surgery Review of History & Physical:    H&P update referred to the surgeon.         Ready For Surgery From Anesthesia Perspective.

## 2019-08-20 NOTE — PLAN OF CARE
Riley Alas has met all discharge criteria from Phase II. Vital Signs are stable, ambulating  without difficulty. Discharge instructions given, patient verbalized understanding. Discharged from facility via wheelchair in stable condition.

## 2019-08-20 NOTE — ANESTHESIA POSTPROCEDURE EVALUATION
Anesthesia Post Evaluation    Patient: Riley Alas    Procedure(s) Performed: Procedure(s) (LRB):  EXTRACTION, CATARACT, WITH IOL INSERTION (Left)    Final Anesthesia Type: MAC  Patient location during evaluation: [facility]  Patient participation: Yes- Able to Participate  Level of consciousness: awake and alert  Post-procedure vital signs: reviewed and stable  Pain management: adequate  Airway patency: patent  PONV status at discharge: No PONV  Anesthetic complications: no      Cardiovascular status: blood pressure returned to baseline  Respiratory status: unassisted  Hydration status: euvolemic  Follow-up not needed.          Vitals Value Taken Time   /84 8/20/2019  6:20 AM   Temp 36.4 °C (97.6 °F) 8/20/2019  6:20 AM   Pulse 71 8/20/2019  6:20 AM   Resp 18 8/20/2019  6:20 AM   SpO2 94 % 8/20/2019  6:20 AM         No case tracking events are documented in the log.      Pain/Mihir Score: No data recorded

## 2019-09-30 ENCOUNTER — OFFICE VISIT (OUTPATIENT)
Dept: CARDIOLOGY | Facility: CLINIC | Age: 79
End: 2019-09-30
Attending: INTERNAL MEDICINE
Payer: MEDICARE

## 2019-09-30 VITALS
HEART RATE: 83 BPM | DIASTOLIC BLOOD PRESSURE: 78 MMHG | SYSTOLIC BLOOD PRESSURE: 123 MMHG | HEIGHT: 71 IN | BODY MASS INDEX: 34.87 KG/M2

## 2019-09-30 DIAGNOSIS — I10 ESSENTIAL HYPERTENSION: Primary | ICD-10-CM

## 2019-09-30 DIAGNOSIS — E66.01 MORBID OBESITY: ICD-10-CM

## 2019-09-30 DIAGNOSIS — I48.0 PAROXYSMAL ATRIAL FIBRILLATION: ICD-10-CM

## 2019-09-30 DIAGNOSIS — I63.30 CEREBROVASCULAR ACCIDENT (CVA) DUE TO THROMBOSIS OF CEREBRAL ARTERY: ICD-10-CM

## 2019-09-30 DIAGNOSIS — E11.9 TYPE 2 DIABETES MELLITUS WITHOUT COMPLICATION, WITHOUT LONG-TERM CURRENT USE OF INSULIN: ICD-10-CM

## 2019-09-30 DIAGNOSIS — J44.9 CHRONIC OBSTRUCTIVE PULMONARY DISEASE, UNSPECIFIED COPD TYPE: ICD-10-CM

## 2019-09-30 DIAGNOSIS — G47.33 OSA (OBSTRUCTIVE SLEEP APNEA): ICD-10-CM

## 2019-09-30 DIAGNOSIS — I48.20 CHRONIC ATRIAL FIBRILLATION: ICD-10-CM

## 2019-09-30 PROCEDURE — 93000 ELECTROCARDIOGRAM COMPLETE: CPT | Mod: S$GLB,,, | Performed by: INTERNAL MEDICINE

## 2019-09-30 PROCEDURE — 3078F DIAST BP <80 MM HG: CPT | Mod: CPTII,S$GLB,, | Performed by: INTERNAL MEDICINE

## 2019-09-30 PROCEDURE — 3074F SYST BP LT 130 MM HG: CPT | Mod: CPTII,S$GLB,, | Performed by: INTERNAL MEDICINE

## 2019-09-30 PROCEDURE — 99214 PR OFFICE/OUTPT VISIT, EST, LEVL IV, 30-39 MIN: ICD-10-PCS | Mod: 25,S$GLB,, | Performed by: INTERNAL MEDICINE

## 2019-09-30 PROCEDURE — 99214 OFFICE O/P EST MOD 30 MIN: CPT | Mod: 25,S$GLB,, | Performed by: INTERNAL MEDICINE

## 2019-09-30 PROCEDURE — 3078F PR MOST RECENT DIASTOLIC BLOOD PRESSURE < 80 MM HG: ICD-10-PCS | Mod: CPTII,S$GLB,, | Performed by: INTERNAL MEDICINE

## 2019-09-30 PROCEDURE — 3074F PR MOST RECENT SYSTOLIC BLOOD PRESSURE < 130 MM HG: ICD-10-PCS | Mod: CPTII,S$GLB,, | Performed by: INTERNAL MEDICINE

## 2019-09-30 PROCEDURE — 93000 PR ELECTROCARDIOGRAM, COMPLETE: ICD-10-PCS | Mod: S$GLB,,, | Performed by: INTERNAL MEDICINE

## 2019-09-30 NOTE — PROGRESS NOTES
Subjective:    Patient ID:  Riley Alas is a 79 y.o. male     HPI  Here for follow-up of chronic atrial fibrillation, essential hypertension, diabetes mellitus, COPD, morbid obesity, obstructive sleep apnea, history of cerebrovascular accident.     I saw an orthopedic surgeon for my shoulder pain.  He gave me a steroid shot in the shoulder which did not help.  He asked to check with the cardiologist as to what medications I could possibly take to control the pain.  I do not use the CPAP at night, I can't keep it on.  I use oxygen all the time and sleep flat in bed without any issues.    Current Outpatient Medications   Medication Sig    ALBUTEROL INHL Inhale 2 puffs into the lungs 4 (four) times daily as needed (wheezing).    allopurinol (ZYLOPRIM) 300 MG tablet Take 100 mg by mouth once daily.     apixaban (ELIQUIS) 5 mg Tab Take 2.5 mg by mouth once daily.     atorvastatin (LIPITOR) 20 MG tablet Take 20 mg by mouth once daily.     calcitRIOL (ROCALTROL) 0.25 MCG Cap     cholecalciferol, vitamin D3, 3,000 unit Tab Take 1 % by mouth.    dabigatran etexilate (PRADAXA) 150 mg Cap Take 1 capsule (150 mg total) by mouth 2 (two) times daily.    digoxin (LANOXIN) 250 mcg tablet Take 250 mcg by mouth once daily.    diltiazem (CARDIZEM CD) 180 MG 24 hr capsule Take 180 mg by mouth once daily.     finasteride (PROSCAR) 5 mg tablet Take 5 mg by mouth once daily.     fluoxetine (PROZAC) 40 MG capsule Take 40 mg by mouth once daily.     fluticasone-salmeterol diskus inhaler 500-50 mcg Inhale 1 puff into the lungs 2 (two) times daily. Controller    furosemide (LASIX) 10 mg/mL solution Take by mouth once daily.      JANUVIA 50 mg Tab Take 50 mg by mouth once daily.     levetiracetam (KEPPRA) 500 MG Tab Take 500 mg by mouth 2 (two) times daily.     levothyroxine (SYNTHROID) 25 MCG tablet Take 25 mcg by mouth before breakfast.     losartan (COZAAR) 100 MG tablet Take 100 mg by mouth once daily.    memantine  "(NAMENDA) 10 MG Tab Take 10 mg by mouth once daily.    metoprolol tartrate (LOPRESSOR) 50 MG tablet Take 50 mg by mouth once daily.     oxybutynin (DITROPAN) 5 MG Tab     pantoprazole (PROTONIX) 40 MG tablet Take 40 mg by mouth once daily.    ramipril (ALTACE) 2.5 MG capsule     SPIRIVA WITH HANDIHALER 18 mcg inhalation capsule INHALE ONE PUFF BY MOUTH ONCE DAILY    sucralfate (CARAFATE) 1 gram tablet     ZEMPLAR 1 mcg capsule     zolpidem (AMBIEN) 10 mg Tab      No current facility-administered medications for this visit.        Review of Systems   Constitution: Negative for chills, decreased appetite, fever, weight gain and weight loss.   HENT: Negative for congestion, hearing loss and sore throat.    Eyes: Negative for blurred vision, double vision and visual disturbance.   Cardiovascular: Negative for chest pain, claudication, dyspnea on exertion, leg swelling, palpitations and syncope.   Respiratory: Negative for cough, hemoptysis, shortness of breath, sputum production and wheezing.    Endocrine: Negative for cold intolerance and heat intolerance.   Hematologic/Lymphatic: Negative for bleeding problem. Does not bruise/bleed easily.   Skin: Negative for color change, dry skin, flushing and itching.   Musculoskeletal: Positive for joint pain. Negative for back pain and myalgias.   Gastrointestinal: Negative for abdominal pain, anorexia, constipation, diarrhea, dysphagia, nausea and vomiting.        No bleeding per rectum   Genitourinary: Negative for dysuria, flank pain, frequency, hematuria and nocturia.   Neurological: Negative for dizziness, headaches, light-headedness, loss of balance, seizures and tremors.   Psychiatric/Behavioral: Negative for altered mental status and depression.         Vitals:    09/30/19 1427   BP: 123/78   Pulse: 83   Height: 5' 11" (1.803 m)     Objective:    Physical Exam   Constitutional: He is oriented to person, place, and time. He appears well-developed and " well-nourished.   HENT:   Head: Normocephalic and atraumatic.   Right Ear: External ear normal.   Left Ear: External ear normal.   Nose: Nose normal.   Eyes: Pupils are equal, round, and reactive to light. Conjunctivae and EOM are normal. No scleral icterus.   Neck: Normal range of motion. Neck supple. No JVD present. No tracheal deviation present. No thyromegaly present.   Cardiovascular: Normal rate and normal heart sounds. Exam reveals no gallop and no friction rub.   No murmur heard.  Irregularly irregular   Pulmonary/Chest: Effort normal and breath sounds normal. No respiratory distress. He has no rales. He exhibits no tenderness.   Abdominal: Soft. Bowel sounds are normal. He exhibits no distension and no mass. There is no tenderness.   Musculoskeletal: Normal range of motion. He exhibits no edema or tenderness.   Lymphadenopathy:     He has no cervical adenopathy.   Neurological: He is alert and oriented to person, place, and time. He has normal reflexes. No cranial nerve deficit. Coordination normal.   Skin: Skin is warm and dry. No rash noted.   Psychiatric: He has a normal mood and affect. His behavior is normal.         Assessment:       1. Essential hypertension    2. Chronic atrial fibrillation    3. Chronic obstructive pulmonary disease, unspecified COPD type    4. Cerebrovascular accident (CVA) due to thrombosis of cerebral artery    5. Morbid obesity    6. Type 2 diabetes mellitus without complication, without long-term current use of insulin    7. MONO (obstructive sleep apnea)         Plan:       Continue present pharmacological regimen.  Try tramadol for shoulder pain.

## (undated) DEVICE — SYR SLIP TIP 1CC

## (undated) DEVICE — SUT ETHILON 10/0 CS160-6

## (undated) DEVICE — CASSETTE INFINITI

## (undated) DEVICE — CANNULA ANTERIOR CHAMBER 30G

## (undated) DEVICE — Device

## (undated) DEVICE — GLOVE BIOGEL SKINSENSE PI 6.0

## (undated) DEVICE — SOL BETADINE 5%

## (undated) DEVICE — RETRACTOR TRANSCORNEAL IRIS

## (undated) DEVICE — GLOVE BIOGEL SKINSENSE PI 6.5

## (undated) DEVICE — CANNULA NUCLEUS HYRDODISECTOR

## (undated) DEVICE — TIP PHACO OZIL-12 0 .9MM